# Patient Record
Sex: MALE | Race: WHITE
[De-identification: names, ages, dates, MRNs, and addresses within clinical notes are randomized per-mention and may not be internally consistent; named-entity substitution may affect disease eponyms.]

---

## 2023-01-24 ENCOUNTER — HOSPITAL ENCOUNTER (OUTPATIENT)
Dept: HOSPITAL 47 - RADXRMAIN | Age: 43
Discharge: HOME | End: 2023-01-24
Attending: EMERGENCY MEDICINE
Payer: COMMERCIAL
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
## 2023-01-24 DIAGNOSIS — S50.01XA: Primary | ICD-10-CM

## 2023-01-24 NOTE — XR
EXAMINATION TYPE: XR elbow complete RT

 

DATE OF EXAM: 1/24/2023

 

COMPARISON: None

 

HISTORY: 43-year-old male

 

TECHNIQUE: 3 views

 

FINDINGS:  

No acute fracture, subluxation, or dislocation. No elbow joint effusion.

 

 

IMPRESSION:  

No acute osseous abnormality seen.

## 2023-02-02 ENCOUNTER — HOSPITAL ENCOUNTER (OUTPATIENT)
Dept: HOSPITAL 47 - RADXRMAIN | Age: 43
Discharge: HOME | End: 2023-02-02
Attending: EMERGENCY MEDICINE
Payer: COMMERCIAL

## 2023-02-02 DIAGNOSIS — S50.01XD: Primary | ICD-10-CM

## 2023-02-02 NOTE — XR
EXAMINATION TYPE: XR elbow complete RT

 

DATE OF EXAM: 2/2/2023

 

CLINICAL HISTORY: Contusion injury with pain

 

TECHNIQUE:  Frontal, lateral and oblique images of the right elbow are obtained.

 

COMPARISON: Prior right elbow x-ray January 24, 2023

 

FINDINGS:  There is no acute fracture/dislocation evident in the right elbow.  No abnormal fat pad si
gns are seen.  The overlying soft tissue appears unremarkable.

 

IMPRESSION: As above. No significant change from prior.

## 2023-04-02 ENCOUNTER — HOSPITAL ENCOUNTER (EMERGENCY)
Dept: HOSPITAL 47 - EC | Age: 43
Discharge: HOME | End: 2023-04-02
Payer: COMMERCIAL

## 2023-04-02 VITALS — SYSTOLIC BLOOD PRESSURE: 105 MMHG | DIASTOLIC BLOOD PRESSURE: 72 MMHG | HEART RATE: 78 BPM | RESPIRATION RATE: 18 BRPM

## 2023-04-02 VITALS — TEMPERATURE: 97.4 F

## 2023-04-02 DIAGNOSIS — R07.89: Primary | ICD-10-CM

## 2023-04-02 DIAGNOSIS — F17.200: ICD-10-CM

## 2023-04-02 LAB
ALBUMIN SERPL-MCNC: 4.7 G/DL (ref 3.5–5)
ALP SERPL-CCNC: 65 U/L (ref 38–126)
ALT SERPL-CCNC: 28 U/L (ref 4–49)
ANION GAP SERPL CALC-SCNC: 11 MMOL/L
APTT BLD: 22.9 SEC (ref 22–30)
AST SERPL-CCNC: 23 U/L (ref 17–59)
BASOPHILS # BLD AUTO: 0.1 K/UL (ref 0–0.2)
BASOPHILS NFR BLD AUTO: 1 %
BUN SERPL-SCNC: 17 MG/DL (ref 9–20)
CALCIUM SPEC-MCNC: 9.3 MG/DL (ref 8.4–10.2)
CHLORIDE SERPL-SCNC: 106 MMOL/L (ref 98–107)
CO2 SERPL-SCNC: 23 MMOL/L (ref 22–30)
EOSINOPHIL # BLD AUTO: 0.2 K/UL (ref 0–0.7)
EOSINOPHIL NFR BLD AUTO: 2 %
ERYTHROCYTE [DISTWIDTH] IN BLOOD BY AUTOMATED COUNT: 5.06 M/UL (ref 4.3–5.9)
ERYTHROCYTE [DISTWIDTH] IN BLOOD: 13.1 % (ref 11.5–15.5)
GLUCOSE SERPL-MCNC: 93 MG/DL (ref 74–99)
HCT VFR BLD AUTO: 45.7 % (ref 39–53)
HGB BLD-MCNC: 15.9 GM/DL (ref 13–17.5)
INR PPP: 0.9 (ref ?–1.2)
LYMPHOCYTES # SPEC AUTO: 2.2 K/UL (ref 1–4.8)
LYMPHOCYTES NFR SPEC AUTO: 26 %
MAGNESIUM SPEC-SCNC: 1.9 MG/DL (ref 1.6–2.3)
MCH RBC QN AUTO: 31.4 PG (ref 25–35)
MCHC RBC AUTO-ENTMCNC: 34.8 G/DL (ref 31–37)
MCV RBC AUTO: 90.2 FL (ref 80–100)
MONOCYTES # BLD AUTO: 0.4 K/UL (ref 0–1)
MONOCYTES NFR BLD AUTO: 5 %
NEUTROPHILS # BLD AUTO: 5.4 K/UL (ref 1.3–7.7)
NEUTROPHILS NFR BLD AUTO: 64 %
PLATELET # BLD AUTO: 227 K/UL (ref 150–450)
POTASSIUM SERPL-SCNC: 4.3 MMOL/L (ref 3.5–5.1)
PROT SERPL-MCNC: 7.7 G/DL (ref 6.3–8.2)
PT BLD: 9.9 SEC (ref 9–12)
SODIUM SERPL-SCNC: 140 MMOL/L (ref 137–145)
WBC # BLD AUTO: 8.4 K/UL (ref 3.8–10.6)

## 2023-04-02 PROCEDURE — 85379 FIBRIN DEGRADATION QUANT: CPT

## 2023-04-02 PROCEDURE — 96374 THER/PROPH/DIAG INJ IV PUSH: CPT

## 2023-04-02 PROCEDURE — 99285 EMERGENCY DEPT VISIT HI MDM: CPT

## 2023-04-02 PROCEDURE — 83735 ASSAY OF MAGNESIUM: CPT

## 2023-04-02 PROCEDURE — 85730 THROMBOPLASTIN TIME PARTIAL: CPT

## 2023-04-02 PROCEDURE — 36415 COLL VENOUS BLD VENIPUNCTURE: CPT

## 2023-04-02 PROCEDURE — 85610 PROTHROMBIN TIME: CPT

## 2023-04-02 PROCEDURE — 84484 ASSAY OF TROPONIN QUANT: CPT

## 2023-04-02 PROCEDURE — 85025 COMPLETE CBC W/AUTO DIFF WBC: CPT

## 2023-04-02 PROCEDURE — 80053 COMPREHEN METABOLIC PANEL: CPT

## 2023-04-02 PROCEDURE — 93005 ELECTROCARDIOGRAM TRACING: CPT

## 2023-04-02 PROCEDURE — 71046 X-RAY EXAM CHEST 2 VIEWS: CPT

## 2023-04-02 NOTE — XR
EXAMINATION TYPE: XR chest 2V

 

DATE OF EXAM: 4/2/2023

 

COMPARISON: NONE

 

HISTORY: Chest pain

 

TECHNIQUE:  Frontal and lateral views of the chest are obtained.

 

FINDINGS: 

 

The lungs are clear of consolidative or abnormal interstitial opacity.

 

There is no pleural effusion, pleural thickening or pneumothorax.

 

The heart, pulmonary vasculature, mediastinum and hilum appear normal. The osseous structures are int
act.

 

IMPRESSION:  

 

No acute cardiopulmonary process.

## 2023-04-02 NOTE — ED
General Adult HPI





- General


Chief complaint: Chest Pain


Stated complaint: Chest Pain


Time Seen by Provider: 04/02/23 13:55


Source: patient, RN notes reviewed, old records reviewed


Mode of arrival: ambulatory


Limitations: no limitations





- History of Present Illness


Initial comments: 





This is a 43-year-old male presents emergency department with past medical 

history significant for smoking.  Patient denies any family history of heart 

disease.  Patient denies diabetes hypertension high cholesterol.  Patient states

she was at home he started having some upper back pain on the left and radiated 

around to his upper chest and then became central in his chest.  Patient states 

it does appear to be worse with taking a deep breath or twisting blood palpation

of the area does not seem to her.  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 

with a lot of heavy lifting but he doesn't feel as though this is muscular.  

Patient also states she feels a little bit short of breath but deftly hurts to 

take a deep breath.  Patient denies any lightheadedness dizziness.  Patient 

denies headache patient denies numbness weakness per patient denies abdominal 

pain.  Patient denies any swelling to the legs or calf tenderness.





- Related Data


                                    Allergies











Allergy/AdvReac Type Severity Reaction Status Date / Time


 


No Known Allergies Allergy   Verified 04/02/23 13:41














Review of Systems


ROS Statement: 


Those systems with pertinent positive or pertinent negative responses have been 

documented in the HPI.





ROS Other: All systems not noted in ROS Statement are negative.





Past Medical History


Past Medical History: No Reported History


History of Any Multi-Drug Resistant Organisms: None Reported


Past Surgical History: No Surgical Hx Reported


Past Psychological History: No Psychological Hx Reported


Smoking Status: Current every day smoker


Past Alcohol Use History: Occasional


Past Drug Use History: None Reported





General Exam





- General Exam Comments


Initial Comments: 





GENERAL:


Patient is well-developed and well-nourished.  Patient is nontoxic and well-

hydrated and is in mild distress.





ENT:


Neck is soft and supple.  No significant lymphadenopathy is noted.  Oropharynx 

is clear.  Moist mucous membranes.  Neck has full range of motion without 

eliciting any pain.  





EYES:


The sclera were anicteric and conjunctiva were pink and moist.  Extraocular 

movements were intact and pupils were equal round and reactive to light.  

Eyelids were unremarkable.





PULMONARY:


Unlabored respirations.  Good breath sounds bilaterally.  No audible rales 

rhonchi or wheezing was noted.





CARDIOVASCULAR:


There is a regular rate and rhythm without any murmurs gallops or rubs. 





ABDOMEN:


Soft and nontender with normal bowel sounds. 





SKIN:


Skin is clear with no lesions or rashes and otherwise unremarkable.





NEUROLOGIC:


Patient is alert and oriented x3.  Cranial nerves II through XII are grossly 

intact.  Motor and sensory are also intact.  Normal speech, volume and content. 

Symmetrical smile.





MUSCULOSKELETAL:


Normal extremities with adequate strength and full range of motion.  No lower 

extremity swelling or edema.  No calf tenderness.





LYMPHATICS:


No significant lymphadenopathy is noted





PSYCHIATRIC:


Normal psychiatric evaluation. 


Limitations: no limitations





Course


                                   Vital Signs











  04/02/23





  13:42


 


Temperature 97.4 F L


 


Pulse Rate 84


 


Respiratory 16





Rate 


 


Blood Pressure 128/82


 


O2 Sat by Pulse 99





Oximetry 














Medical Decision Making





- Medical Decision Making





EKG shows a sinus rhythm at 76 bpm CT interval 206 QRS is 88 QT interval 350 QTC

is 380.  Patient's EKG shows no ST segment elevation or depression.





Was pt. sent in by a medical professional or institution (, PA, NP, urgent 

care, hospital, or nursing home...) When possible be specific


@  -No


Did you speak to anyone other than the patient for history (EMS, parent, family,

police, friend...)? What history was obtained from this source 


@  -No


Did you review nursing and triage notes (agree or disagree)?  Why? 


@  -I reviewed and agree with nursing and triage notes


Were old charts reviewed (outside hosp., previous admission, EMS record, old 

EKG, old radiological studies, urgent care reports/EKG's, nursing home records)?

Report findings 


@  -No old charts were reviewed


Differential Diagnosis (chest pain, altered mental status, abdominal pain women,

abdominal pain men, vaginal bleeding, weakness, fever, dyspnea, syncope, 

headache, dizziness, GI bleed, back pain, seizure, CVA, palpatations, mental 

health, musculoskeletal)? 


@  -Differential Chest Pain:


Stable Angina, Unstable Angina, STEMI, NSTEMI Aortic Dissection, Pneumothorax, 

Musculoskeletal, Esophageal Spasm GERD, Cholecystitis, Pancreatitis, Zoster, 

this is not meant to be an all-inclusive list. 





EKG interpreted by me (3pts min.).


@  -As above


X-rays interpreted by me (1pt min.).


@  -Chest x-ray was interpreted by myself 


CT interpreted by me (1pt min.).


@  -None done


U/S interpreted by me (1pt. min.).


@  -None done


What testing was considered but not performed or refused? (CT, X-rays, U/S, 

labs)? Why?


@  -None


What meds were considered but not given or refused? Why?


@  -None


Did you discuss the management of the patient with other professionals 

(professionals i.e. , PA, NP, lab, RT, psych nurse, , , 

teacher, , )? Give summary


@  -No


Was smoking cessation discussed for >3mins.?


@  -No


Was critical care preformed (if so, how long)?


@  -No


Were there social determinants of health that impacted care today? How? 

(Homelessness, low income, unemployed, alcoholism, drug addiction, 

transportation, low edu. Level, literacy, decrease access to med. care, [facility], 

rehab)?


@  -No


Was there de-escalation of care discussed even if they declined (Discuss DNR or 

withdrawal of care, Hospice)? DNR status


@  -No


What co-morbidities impacted this encounter? (DM, HTN, Smoking, COPD, CAD, 

Cancer, CVA, ARF, Chemo, Hep., AIDS, mental health diagnosis, sleep apnea, mor

bid obesity)?


@  -Smoking


Was patient admitted / discharged? Hospital course, mention meds given and 

route, prescriptions, significant lab abnormalities, going to OR and other 

pertinent info.


@  -Patient continued to have chest pain particularly with movement or deep 

breathing.  Patient states he laid still it didn't hurt.  Patient was told he 

should stay in the hospital but after further consideration he stated he would 

go home and follow-up as an outpatient.  Patient's wife was in the room and she 

had no objections.  Nursing also requested him to stay and again refused to 

stay.


Undiagnosed new problem with uncertain prognosis?


@  -No


Drug Therapy requiring intensive monitoring for toxicity (Heparin, Nitro, 

Insulin, Cardizem)?


@  -No


Were any procedures done?


@  -No


Diagnosis/symptom?


@  -Chest pain


Acute, or Chronic, or Acute on Chronic?


@  -Acute


Uncomplicated (without systemic symptoms) or Complicated (systemic symptoms)?


@  -Complicated


Side effects of treatment?


@  -No


Exacerbation, Progression, or Severe Exacerbation?


@  -No


Poses a threat to life or bodily function? How? (Chest pain, USA, MI, pneumonia,

PE, COPD, DKA, ARF, appy, cholecystitis, CVA, Diverticulitis, Homicidal, 

Suicidal, threat to staff... and all critical care pts)


@  -No





- Lab Data


Result diagrams: 


                                 04/02/23 14:09





                                 04/02/23 14:09


                                   Lab Results











  04/02/23 04/02/23 04/02/23 Range/Units





  14:09 14:09 14:09 


 


WBC  8.4    (3.8-10.6)  k/uL


 


RBC  5.06    (4.30-5.90)  m/uL


 


Hgb  15.9    (13.0-17.5)  gm/dL


 


Hct  45.7    (39.0-53.0)  %


 


MCV  90.2    (80.0-100.0)  fL


 


MCH  31.4    (25.0-35.0)  pg


 


MCHC  34.8    (31.0-37.0)  g/dL


 


RDW  13.1    (11.5-15.5)  %


 


Plt Count  227    (150-450)  k/uL


 


MPV  7.2    


 


Neutrophils %  64    %


 


Lymphocytes %  26    %


 


Monocytes %  5    %


 


Eosinophils %  2    %


 


Basophils %  1    %


 


Neutrophils #  5.4    (1.3-7.7)  k/uL


 


Lymphocytes #  2.2    (1.0-4.8)  k/uL


 


Monocytes #  0.4    (0-1.0)  k/uL


 


Eosinophils #  0.2    (0-0.7)  k/uL


 


Basophils #  0.1    (0-0.2)  k/uL


 


PT   9.9   (9.0-12.0)  sec


 


INR   0.9   (<1.2)  


 


APTT   22.9   (22.0-30.0)  sec


 


D-Dimer   <0.17   (<0.60)  mg/L FEU


 


Sodium    140  (137-145)  mmol/L


 


Potassium    4.3  (3.5-5.1)  mmol/L


 


Chloride    106  ()  mmol/L


 


Carbon Dioxide    23  (22-30)  mmol/L


 


Anion Gap    11  mmol/L


 


BUN    17  (9-20)  mg/dL


 


Creatinine    1.01  (0.66-1.25)  mg/dL


 


Est GFR (CKD-EPI)AfAm    >90  (>60 ml/min/1.73 sqM)  


 


Est GFR (CKD-EPI)NonAf    >90  (>60 ml/min/1.73 sqM)  


 


Glucose    93  (74-99)  mg/dL


 


Calcium    9.3  (8.4-10.2)  mg/dL


 


Magnesium    1.9  (1.6-2.3)  mg/dL


 


Total Bilirubin    0.6  (0.2-1.3)  mg/dL


 


AST    23  (17-59)  U/L


 


ALT    28  (4-49)  U/L


 


Alkaline Phosphatase    65  ()  U/L


 


Troponin I     (0.000-0.034)  ng/mL


 


Total Protein    7.7  (6.3-8.2)  g/dL


 


Albumin    4.7  (3.5-5.0)  g/dL














  04/02/23 Range/Units





  14:09 


 


WBC   (3.8-10.6)  k/uL


 


RBC   (4.30-5.90)  m/uL


 


Hgb   (13.0-17.5)  gm/dL


 


Hct   (39.0-53.0)  %


 


MCV   (80.0-100.0)  fL


 


MCH   (25.0-35.0)  pg


 


MCHC   (31.0-37.0)  g/dL


 


RDW   (11.5-15.5)  %


 


Plt Count   (150-450)  k/uL


 


MPV   


 


Neutrophils %   %


 


Lymphocytes %   %


 


Monocytes %   %


 


Eosinophils %   %


 


Basophils %   %


 


Neutrophils #   (1.3-7.7)  k/uL


 


Lymphocytes #   (1.0-4.8)  k/uL


 


Monocytes #   (0-1.0)  k/uL


 


Eosinophils #   (0-0.7)  k/uL


 


Basophils #   (0-0.2)  k/uL


 


PT   (9.0-12.0)  sec


 


INR   (<1.2)  


 


APTT   (22.0-30.0)  sec


 


D-Dimer   (<0.60)  mg/L FEU


 


Sodium   (137-145)  mmol/L


 


Potassium   (3.5-5.1)  mmol/L


 


Chloride   ()  mmol/L


 


Carbon Dioxide   (22-30)  mmol/L


 


Anion Gap   mmol/L


 


BUN   (9-20)  mg/dL


 


Creatinine   (0.66-1.25)  mg/dL


 


Est GFR (CKD-EPI)AfAm   (>60 ml/min/1.73 sqM)  


 


Est GFR (CKD-EPI)NonAf   (>60 ml/min/1.73 sqM)  


 


Glucose   (74-99)  mg/dL


 


Calcium   (8.4-10.2)  mg/dL


 


Magnesium   (1.6-2.3)  mg/dL


 


Total Bilirubin   (0.2-1.3)  mg/dL


 


AST   (17-59)  U/L


 


ALT   (4-49)  U/L


 


Alkaline Phosphatase   ()  U/L


 


Troponin I  <0.012  (0.000-0.034)  ng/mL


 


Total Protein   (6.3-8.2)  g/dL


 


Albumin   (3.5-5.0)  g/dL














Disposition


Clinical Impression: 


 Chest pain





Disposition: HOME SELF-CARE


Condition: Good


Instructions (If sedation given, give patient instructions):  Chest Pain (ED)


Is patient prescribed a controlled substance at d/c from ED?: No


Referrals: 


None,Stated [Primary Care Provider] - 1-2 days


Time of Disposition: 15:38